# Patient Record
Sex: FEMALE | Race: ASIAN | NOT HISPANIC OR LATINO | URBAN - METROPOLITAN AREA
[De-identification: names, ages, dates, MRNs, and addresses within clinical notes are randomized per-mention and may not be internally consistent; named-entity substitution may affect disease eponyms.]

---

## 2024-03-04 ENCOUNTER — INPATIENT (INPATIENT)
Facility: HOSPITAL | Age: 29
LOS: 0 days | Discharge: HOME | DRG: 603 | End: 2024-03-05
Attending: INTERNAL MEDICINE | Admitting: HOSPITALIST
Payer: COMMERCIAL

## 2024-03-04 VITALS
RESPIRATION RATE: 96 BRPM | TEMPERATURE: 98 F | SYSTOLIC BLOOD PRESSURE: 135 MMHG | HEART RATE: 100 BPM | OXYGEN SATURATION: 97 % | DIASTOLIC BLOOD PRESSURE: 90 MMHG | HEIGHT: 62 IN

## 2024-03-04 DIAGNOSIS — L03.90 CELLULITIS, UNSPECIFIED: ICD-10-CM

## 2024-03-04 LAB
ALBUMIN SERPL ELPH-MCNC: 4.5 G/DL — SIGNIFICANT CHANGE UP (ref 3.5–5.2)
ALP SERPL-CCNC: 56 U/L — SIGNIFICANT CHANGE UP (ref 30–115)
ALT FLD-CCNC: <5 U/L — SIGNIFICANT CHANGE UP (ref 0–41)
ANION GAP SERPL CALC-SCNC: 12 MMOL/L — SIGNIFICANT CHANGE UP (ref 7–14)
AST SERPL-CCNC: 19 U/L — SIGNIFICANT CHANGE UP (ref 0–41)
BASOPHILS # BLD AUTO: 0.06 K/UL — SIGNIFICANT CHANGE UP (ref 0–0.2)
BASOPHILS NFR BLD AUTO: 0.6 % — SIGNIFICANT CHANGE UP (ref 0–1)
BILIRUB SERPL-MCNC: 0.3 MG/DL — SIGNIFICANT CHANGE UP (ref 0.2–1.2)
BUN SERPL-MCNC: 7 MG/DL — LOW (ref 10–20)
CALCIUM SERPL-MCNC: 10.1 MG/DL — SIGNIFICANT CHANGE UP (ref 8.4–10.5)
CHLORIDE SERPL-SCNC: 103 MMOL/L — SIGNIFICANT CHANGE UP (ref 98–110)
CO2 SERPL-SCNC: 26 MMOL/L — SIGNIFICANT CHANGE UP (ref 17–32)
CREAT SERPL-MCNC: 0.6 MG/DL — LOW (ref 0.7–1.5)
EGFR: 127 ML/MIN/1.73M2 — SIGNIFICANT CHANGE UP
EOSINOPHIL # BLD AUTO: 0.16 K/UL — SIGNIFICANT CHANGE UP (ref 0–0.7)
EOSINOPHIL NFR BLD AUTO: 1.7 % — SIGNIFICANT CHANGE UP (ref 0–8)
GLUCOSE SERPL-MCNC: 99 MG/DL — SIGNIFICANT CHANGE UP (ref 70–99)
HCT VFR BLD CALC: 40.1 % — SIGNIFICANT CHANGE UP (ref 37–47)
HGB BLD-MCNC: 13 G/DL — SIGNIFICANT CHANGE UP (ref 12–16)
IMM GRANULOCYTES NFR BLD AUTO: 0.3 % — SIGNIFICANT CHANGE UP (ref 0.1–0.3)
LYMPHOCYTES # BLD AUTO: 2.3 K/UL — SIGNIFICANT CHANGE UP (ref 1.2–3.4)
LYMPHOCYTES # BLD AUTO: 24.6 % — SIGNIFICANT CHANGE UP (ref 20.5–51.1)
MCHC RBC-ENTMCNC: 29.1 PG — SIGNIFICANT CHANGE UP (ref 27–31)
MCHC RBC-ENTMCNC: 32.4 G/DL — SIGNIFICANT CHANGE UP (ref 32–37)
MCV RBC AUTO: 89.7 FL — SIGNIFICANT CHANGE UP (ref 81–99)
MONOCYTES # BLD AUTO: 0.66 K/UL — HIGH (ref 0.1–0.6)
MONOCYTES NFR BLD AUTO: 7.1 % — SIGNIFICANT CHANGE UP (ref 1.7–9.3)
NEUTROPHILS # BLD AUTO: 6.13 K/UL — SIGNIFICANT CHANGE UP (ref 1.4–6.5)
NEUTROPHILS NFR BLD AUTO: 65.7 % — SIGNIFICANT CHANGE UP (ref 42.2–75.2)
NRBC # BLD: 0 /100 WBCS — SIGNIFICANT CHANGE UP (ref 0–0)
PLATELET # BLD AUTO: 353 K/UL — SIGNIFICANT CHANGE UP (ref 130–400)
PMV BLD: 8.7 FL — SIGNIFICANT CHANGE UP (ref 7.4–10.4)
POTASSIUM SERPL-MCNC: 4.8 MMOL/L — SIGNIFICANT CHANGE UP (ref 3.5–5)
POTASSIUM SERPL-SCNC: 4.8 MMOL/L — SIGNIFICANT CHANGE UP (ref 3.5–5)
PROT SERPL-MCNC: 8.2 G/DL — HIGH (ref 6–8)
RBC # BLD: 4.47 M/UL — SIGNIFICANT CHANGE UP (ref 4.2–5.4)
RBC # FLD: 13.5 % — SIGNIFICANT CHANGE UP (ref 11.5–14.5)
SODIUM SERPL-SCNC: 141 MMOL/L — SIGNIFICANT CHANGE UP (ref 135–146)
WBC # BLD: 9.34 K/UL — SIGNIFICANT CHANGE UP (ref 4.8–10.8)
WBC # FLD AUTO: 9.34 K/UL — SIGNIFICANT CHANGE UP (ref 4.8–10.8)

## 2024-03-04 PROCEDURE — 87140 CULTURE TYPE IMMUNOFLUORESC: CPT

## 2024-03-04 PROCEDURE — 87070 CULTURE OTHR SPECIMN AEROBIC: CPT

## 2024-03-04 PROCEDURE — 85652 RBC SED RATE AUTOMATED: CPT

## 2024-03-04 PROCEDURE — 85027 COMPLETE CBC AUTOMATED: CPT

## 2024-03-04 PROCEDURE — 80048 BASIC METABOLIC PNL TOTAL CA: CPT

## 2024-03-04 PROCEDURE — 86140 C-REACTIVE PROTEIN: CPT

## 2024-03-04 PROCEDURE — 87255 GENET VIRUS ISOLATE HSV: CPT

## 2024-03-04 PROCEDURE — 99223 1ST HOSP IP/OBS HIGH 75: CPT

## 2024-03-04 PROCEDURE — 36415 COLL VENOUS BLD VENIPUNCTURE: CPT

## 2024-03-04 PROCEDURE — 87186 SC STD MICRODIL/AGAR DIL: CPT

## 2024-03-04 PROCEDURE — 83735 ASSAY OF MAGNESIUM: CPT

## 2024-03-04 PROCEDURE — 87389 HIV-1 AG W/HIV-1&-2 AB AG IA: CPT

## 2024-03-04 PROCEDURE — 80074 ACUTE HEPATITIS PANEL: CPT

## 2024-03-04 PROCEDURE — G0378: CPT

## 2024-03-04 PROCEDURE — 99285 EMERGENCY DEPT VISIT HI MDM: CPT

## 2024-03-04 RX ORDER — ACYCLOVIR SODIUM 500 MG
VIAL (EA) INTRAVENOUS
Refills: 0 | Status: DISCONTINUED | OUTPATIENT
Start: 2024-03-04 | End: 2024-03-05

## 2024-03-04 RX ORDER — ACYCLOVIR SODIUM 500 MG
380 VIAL (EA) INTRAVENOUS EVERY 8 HOURS
Refills: 0 | Status: DISCONTINUED | OUTPATIENT
Start: 2024-03-04 | End: 2024-03-05

## 2024-03-04 RX ORDER — DIPHENHYDRAMINE HCL 50 MG
25 CAPSULE ORAL ONCE
Refills: 0 | Status: COMPLETED | OUTPATIENT
Start: 2024-03-04 | End: 2024-03-04

## 2024-03-04 RX ORDER — ACYCLOVIR SODIUM 500 MG
380 VIAL (EA) INTRAVENOUS ONCE
Refills: 0 | Status: COMPLETED | OUTPATIENT
Start: 2024-03-04 | End: 2024-03-04

## 2024-03-04 RX ORDER — LINEZOLID 600 MG/300ML
600 INJECTION, SOLUTION INTRAVENOUS EVERY 12 HOURS
Refills: 0 | Status: DISCONTINUED | OUTPATIENT
Start: 2024-03-04 | End: 2024-03-05

## 2024-03-04 RX ORDER — ERYTHROMYCIN BASE 5 MG/GRAM
1 OINTMENT (GRAM) OPHTHALMIC (EYE)
Refills: 0 | Status: DISCONTINUED | OUTPATIENT
Start: 2024-03-04 | End: 2024-03-05

## 2024-03-04 RX ADMIN — Medication 25 MILLIGRAM(S): at 21:19

## 2024-03-04 RX ADMIN — Medication 107.6 MILLIGRAM(S): at 21:19

## 2024-03-04 RX ADMIN — Medication 1 APPLICATION(S): at 23:27

## 2024-03-04 RX ADMIN — LINEZOLID 300 MILLIGRAM(S): 600 INJECTION, SOLUTION INTRAVENOUS at 15:29

## 2024-03-04 RX ADMIN — Medication 107.6 MILLIGRAM(S): at 16:43

## 2024-03-04 NOTE — H&P ADULT - ASSESSMENT
27 y/o F with no PMH, admitted for treatment pf pre-septal cellulitis with suspected herpetic infection.    #Pre-septal pustular cellulitis  #suspected herpetic infection/V1 shingles?  - no visual defects  - normal eye movements  - corneal staining WNL  - c/w acyclovir  - c/w linezolid  - consult ID  - Ophthalmo recs appreciated    >Erythromycin ointment 3 x days around the eye.    >Derm consult would be appreciated

## 2024-03-04 NOTE — H&P ADULT - NSHPLABSRESULTS_GEN_ALL_CORE
LABS:                          13.0   9.34  )-----------( 353      ( 04 Mar 2024 14:54 )             40.1     03-04    141  |  103  |  7<L>  ----------------------------<  99  4.8   |  26  |  0.6<L>    Ca    10.1      04 Mar 2024 14:54    TPro  8.2<H>  /  Alb  4.5  /  TBili  0.3  /  DBili  x   /  AST  19  /  ALT  <5  /  AlkPhos  56  03-04

## 2024-03-04 NOTE — H&P ADULT - ATTENDING COMMENTS
Patient seen and examined at bedside independently of the residents. I read the resident's note and agree with the plan with the additions and corrections as noted below. My note supersedes the resident's note.     REVIEW OF SYSTEMS:  Negative except in HPI.     PMH: Herpes labialis     FHx: Reviewed. No fhx of asthma/copd, No fhx of liver and pulmonary disease. No fhx of hematological disorder.     Physical Exam:  GEN: No acute distress. Awake, Alert and oriented x 3.   Head: Atraumatic, Normocephalic.   Eye: PEERLA. No sclera icterus. EOMI. Right eyelid swelling with multiple small pustular lesions (R>L)  ENT: Normal oropharynx, no thyromegaly, no mass, no lymphadenopathy.   LUNGS: Clear to auscultation bilaterally. No wheeze/rales/crackles.   HEART: Normal. S1/S2 present. RRR. No murmur/gallops.   ABD: Soft, non-tender, non-distended. Bowel sounds present.   EXT: No pitting edema. No erythema. No tenderness.  Integumentary: No rash, No sore, No petechia.   NEURO: CN III-XII intact. Strength: 5/5 b/l ULE. Sensory intact b/l ULE.     Vital Signs Last 24 Hrs  T(C): 36.7 (04 Mar 2024 21:30), Max: 36.7 (04 Mar 2024 20:55)  T(F): 98.1 (04 Mar 2024 21:30), Max: 98.1 (04 Mar 2024 20:55)  HR: 93 (04 Mar 2024 21:30) (92 - 100)  BP: 125/67 (04 Mar 2024 21:30) (122/73 - 135/90)  BP(mean): --  RR: 18 (04 Mar 2024 21:30) (18 - 96)  SpO2: 100% (04 Mar 2024 21:30) (97% - 100%)    Parameters below as of 04 Mar 2024 20:55  Patient On (Oxygen Delivery Method): room air      Please see the above notes for Labs and radiology.     Assessment and Plan:     27 yo F with no significant PMH, wear Ortho-K lens presents to ED for right eye lid swelling, pain with discharge. Patient is sexually active and reports that she had similar HSV infection around her lips a months ago which resolved on PO medication given by her dermatologist.     Right eye pre-septal cellulitis with suspected HSV infection   - s/p eval by ophthalmology in ED.   - start on acyclovir   - s/p Linezolid and Acyclovir given in ED.   - will c/w Linezolid and start on Unasyn for now  - check HIV, Hepatitis panel  - ID consult.   - f/u Ophthalmology    DVT ppx: encourage ambulation  GI ppx:  not indicated.   Diet: regular diet  Activity: as tolerated.     Date seen by the attendin2024  Total time spent: 75 minutes

## 2024-03-04 NOTE — CONSULT NOTE ADULT - SUBJECTIVE AND OBJECTIVE BOX
EYE EXAM BEDSIDE   Patient noticed on Saturday bumps and swollen lid of the right eye. It started a day ago on the left eye.   ROS all negative  Patient was recently in [place] and came back this weekend.     Patient endorses it feels worse, and itchy.     No change in vision, No pain on eye movement.   No Flashes, No Floaters  Patient Does ORtho-K , D/c contacts since Patient felt lid irritation     ACYCLOVIR is going to be started       Va: sc OD, OS 20/20     Pupils: PERRL NO APD  EOM:  Full with No pain on Movement   VF Full    SLE   OD Lid edema Superior and inferior,   Pustules Upper lid at least 11  Lower lid 20    OS Trace Edema with Nasal 2 superior and 2 inferior Pustules    Conj  OD temp hyperemic Grade 1 OS wnl  Cornea NO STAINING WNL   lens wnl   angle Deep and Quiet   IOP:   17 OD,OS via Icare at 3:15PM  DFE:  Deferred, Good View of Posterior   c/d .35  a/v 2/3  mac wnl         D/W Dr. Fadi Reyes

## 2024-03-04 NOTE — H&P ADULT - HISTORY OF PRESENT ILLNESS
27 y/o F with no PMH, overnight use of ortho-K lenses presenting for right eyelid swelling, pain, redness and pustular lesions over the past 2 days. Denies fever , chills, painful eye movement, pus drainage, or decrease in visual acuity.    + hx of chicken pox as a child  Labs and vitals are unremarkable.  patient admitted for treatment pf pre-septal cellulitis with suspected herpetic infection/shingles.

## 2024-03-04 NOTE — PATIENT PROFILE ADULT - PATIENT'S SEXUAL ORIENTATION
Refer to GYN for a cystocele evaluation  I will call your the urine test results  Keep your appointment with me as previously scheduled. Start ciprofloxacin 250 MG twice daily for 5 days. Pending cultures. Heterosexual

## 2024-03-04 NOTE — ED PROVIDER NOTE - CLINICAL SUMMARY MEDICAL DECISION MAKING FREE TEXT BOX
Patient presenting with bumps and swelling to the right eyelid now extending to the left eyelid.  She has no fever chills vision changes.  Did recently travel from .  On exam she had pustules surrounding the right eye Lids staining pupils the right eye within normal meds.  Left eye with a few scattered pustules.  Impression  Patient with pustules around the eye and discussed with ID recommended IV antibiotics, IV antivirals and admission.  Patient also seen by ophthalmology.  Ophthalmology recommending with mycin ointment and Derm consult.

## 2024-03-04 NOTE — ED PROVIDER NOTE - PHYSICAL EXAMINATION
CONSTITUTIONAL: Well-developed; well-nourished; in no acute distress.   SKIN: Warm, dry  HEAD: Normocephalic; atraumatic  EYES: EOMI, normal sclera and conjunctiva. Right upper and lower eyelid erythema and swelling. Multiple pustular lesions to upper and lower right eyelids; 1 pustular lesion to left upper eyelid and 1 to left lower, no drainage. OS 20/20 OD 20/20. No uptake of fluorescein stain.   ENT: No nasal discharge; airway clear. MMM. No oral lesions. Normal TM, ear canal b/l.   CARD:  Regular rate and rhythm. Normal S1, S2.  RESP: No increased WOB. CTA b/l without wheezes, crackles, rhonchi  EXT: Normal ROM.   NEURO: Alert, oriented, grossly unremarkable  PSYCH: Cooperative, appropriate.

## 2024-03-04 NOTE — PATIENT PROFILE ADULT - FALL HARM RISK - HARM RISK INTERVENTIONS

## 2024-03-04 NOTE — CONSULT NOTE ADULT - ASSESSMENT
1. 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   Pustules OD>OS    Recommendation   Erythromycin ointment 3 x days around the eye.   Derm consult would be appreciated    Reconsult if needed.

## 2024-03-04 NOTE — ED PROVIDER NOTE - OBJECTIVE STATEMENT
29 y/o F with no PMH, overnight use of ortho-K lenses presenting for right eyelid swelling, pain, redness lesions x2 days. Pt reports lesions have increased in number over past two days and since today has two lesions to left eyelid. Denies eye pain, vision changes, pain with eye movement. No fevers. [place] [facility] [place] to ED for evaluation for orbital cellulitis. Reports having cold sore "outbreak" 1 month ago. Wears glasses and contacts but no contacts for last few days. spontaneous rupture

## 2024-03-04 NOTE — ED PROVIDER NOTE - PROGRESS NOTE DETAILS
Demetrio: discussed with ID; per Dr. Metzger possible furunculosis, cover for HSV and ORSA. Will obtain labs Demetrio: Per ophthyimi topical bacitracin and they will see her inpatient

## 2024-03-04 NOTE — H&P ADULT - NSHPPHYSICALEXAM_GEN_ALL_CORE
T(C): 36.4 (03-04-24 @ 12:03), Max: 36.4 (03-04-24 @ 12:03)  HR: 100 (03-04-24 @ 12:03) (100 - 100)  BP: 135/90 (03-04-24 @ 12:03) (135/90 - 135/90)  RR: 96 (03-04-24 @ 12:03) (96 - 96)  SpO2: 97% (03-04-24 @ 12:03) (97% - 97%)    CONSTITUTIONAL:no apparent distress  EYES: PERRLA and symmetric, EOMI, No conjunctival or scleral injection, non-icteric, Redness oround right eye with multiple 1-2mm pustules, no pus drainage, normal visual acuity, no visual field defects  ENMT: Oral mucosa with moist membranes. Normal dentition; no pharyngeal injection or exudates   NECK: Supple, symmetric and without tracheal deviation   RESP: No respiratory distress, no use of accessory muscles; CTA b/l, no WRR  CV: RRR, +S1S2, no MRG; no JVD; no peripheral edema  GI: Soft, NT, ND, no rebound, no guarding; t

## 2024-03-04 NOTE — ED PROVIDER NOTE - CONSIDERATION OF ADMISSION OBSERVATION
Consideration of Admission/Observation Admit for IV antibiotics IV antivirals as bilateral symptoms reevaluation

## 2024-03-05 VITALS
HEART RATE: 80 BPM | RESPIRATION RATE: 18 BRPM | TEMPERATURE: 98 F | DIASTOLIC BLOOD PRESSURE: 62 MMHG | SYSTOLIC BLOOD PRESSURE: 114 MMHG | OXYGEN SATURATION: 98 %

## 2024-03-05 LAB
ANION GAP SERPL CALC-SCNC: 13 MMOL/L — SIGNIFICANT CHANGE UP (ref 7–14)
BUN SERPL-MCNC: 6 MG/DL — LOW (ref 10–20)
CALCIUM SERPL-MCNC: 9.3 MG/DL — SIGNIFICANT CHANGE UP (ref 8.4–10.5)
CHLORIDE SERPL-SCNC: 103 MMOL/L — SIGNIFICANT CHANGE UP (ref 98–110)
CO2 SERPL-SCNC: 25 MMOL/L — SIGNIFICANT CHANGE UP (ref 17–32)
CREAT SERPL-MCNC: 0.7 MG/DL — SIGNIFICANT CHANGE UP (ref 0.7–1.5)
CRP SERPL-MCNC: 4.6 MG/L — HIGH
EGFR: 122 ML/MIN/1.73M2 — SIGNIFICANT CHANGE UP
ERYTHROCYTE [SEDIMENTATION RATE] IN BLOOD: 48 MM/HR — HIGH (ref 0–20)
GLUCOSE SERPL-MCNC: 104 MG/DL — HIGH (ref 70–99)
HBV CORE IGM SER-ACNC: SIGNIFICANT CHANGE UP
HBV SURFACE AG SER-ACNC: SIGNIFICANT CHANGE UP
HCT VFR BLD CALC: 37.3 % — SIGNIFICANT CHANGE UP (ref 37–47)
HCV AB S/CO SERPL IA: 0.11 S/CO — SIGNIFICANT CHANGE UP (ref 0–0.99)
HCV AB SERPL-IMP: SIGNIFICANT CHANGE UP
HGB BLD-MCNC: 12.1 G/DL — SIGNIFICANT CHANGE UP (ref 12–16)
HIV 1+2 AB+HIV1 P24 AG SERPL QL IA: SIGNIFICANT CHANGE UP
MAGNESIUM SERPL-MCNC: 2.3 MG/DL — SIGNIFICANT CHANGE UP (ref 1.8–2.4)
MCHC RBC-ENTMCNC: 28.8 PG — SIGNIFICANT CHANGE UP (ref 27–31)
MCHC RBC-ENTMCNC: 32.4 G/DL — SIGNIFICANT CHANGE UP (ref 32–37)
MCV RBC AUTO: 88.8 FL — SIGNIFICANT CHANGE UP (ref 81–99)
NRBC # BLD: 0 /100 WBCS — SIGNIFICANT CHANGE UP (ref 0–0)
PLATELET # BLD AUTO: 314 K/UL — SIGNIFICANT CHANGE UP (ref 130–400)
PMV BLD: 8.8 FL — SIGNIFICANT CHANGE UP (ref 7.4–10.4)
POTASSIUM SERPL-MCNC: 4.2 MMOL/L — SIGNIFICANT CHANGE UP (ref 3.5–5)
POTASSIUM SERPL-SCNC: 4.2 MMOL/L — SIGNIFICANT CHANGE UP (ref 3.5–5)
RBC # BLD: 4.2 M/UL — SIGNIFICANT CHANGE UP (ref 4.2–5.4)
RBC # FLD: 13.5 % — SIGNIFICANT CHANGE UP (ref 11.5–14.5)
SODIUM SERPL-SCNC: 141 MMOL/L — SIGNIFICANT CHANGE UP (ref 135–146)
WBC # BLD: 8.34 K/UL — SIGNIFICANT CHANGE UP (ref 4.8–10.8)
WBC # FLD AUTO: 8.34 K/UL — SIGNIFICANT CHANGE UP (ref 4.8–10.8)

## 2024-03-05 PROCEDURE — 99238 HOSP IP/OBS DSCHRG MGMT 30/<: CPT

## 2024-03-05 RX ORDER — ERYTHROMYCIN BASE 5 MG/GRAM
1 OINTMENT (GRAM) OPHTHALMIC (EYE)
Qty: 1 | Refills: 0
Start: 2024-03-05 | End: 2024-03-07

## 2024-03-05 RX ORDER — AMPICILLIN SODIUM AND SULBACTAM SODIUM 250; 125 MG/ML; MG/ML
3 INJECTION, POWDER, FOR SUSPENSION INTRAMUSCULAR; INTRAVENOUS ONCE
Refills: 0 | Status: COMPLETED | OUTPATIENT
Start: 2024-03-05 | End: 2024-03-05

## 2024-03-05 RX ORDER — AMPICILLIN SODIUM AND SULBACTAM SODIUM 250; 125 MG/ML; MG/ML
INJECTION, POWDER, FOR SUSPENSION INTRAMUSCULAR; INTRAVENOUS
Refills: 0 | Status: DISCONTINUED | OUTPATIENT
Start: 2024-03-05 | End: 2024-03-05

## 2024-03-05 RX ORDER — AMPICILLIN SODIUM AND SULBACTAM SODIUM 250; 125 MG/ML; MG/ML
3 INJECTION, POWDER, FOR SUSPENSION INTRAMUSCULAR; INTRAVENOUS EVERY 6 HOURS
Refills: 0 | Status: DISCONTINUED | OUTPATIENT
Start: 2024-03-05 | End: 2024-03-05

## 2024-03-05 RX ORDER — ACYCLOVIR SODIUM 500 MG
1 VIAL (EA) INTRAVENOUS
Qty: 6 | Refills: 0
Start: 2024-03-05 | End: 2024-03-06

## 2024-03-05 RX ORDER — ACYCLOVIR SODIUM 500 MG
1 VIAL (EA) INTRAVENOUS
Qty: 9 | Refills: 0
Start: 2024-03-05 | End: 2024-03-07

## 2024-03-05 RX ORDER — ACYCLOVIR SODIUM 500 MG
800 VIAL (EA) INTRAVENOUS EVERY 8 HOURS
Refills: 0 | Status: DISCONTINUED | OUTPATIENT
Start: 2024-03-05 | End: 2024-03-06

## 2024-03-05 RX ADMIN — AMPICILLIN SODIUM AND SULBACTAM SODIUM 200 GRAM(S): 250; 125 INJECTION, POWDER, FOR SUSPENSION INTRAMUSCULAR; INTRAVENOUS at 03:22

## 2024-03-05 RX ADMIN — LINEZOLID 300 MILLIGRAM(S): 600 INJECTION, SOLUTION INTRAVENOUS at 06:00

## 2024-03-05 RX ADMIN — Medication 1 APPLICATION(S): at 06:01

## 2024-03-05 RX ADMIN — Medication 107.6 MILLIGRAM(S): at 06:02

## 2024-03-05 NOTE — CONSULT NOTE ADULT - ATTENDING COMMENTS
Assessment  Pt is a 28 y.o. F w/ no PMH, but does use overnight ortho-K lenses, who presents with R eyelid edema, erythema, and pustular lesions since 3/1; no impaired visual acuity, pain w/ eye mov'ts, proptosis, chemosis, or ophthalmoplegia. ID was consulted for pre-septal cellulitis w/ possible HSV infection.    IMPRESSION/RECOMMENDATIONS   #Acute pre-septal cellulitis likely 2/2 HSV-1/2 (prodrome of a day of itch typical for HSV )  No evidence of VZV   Not bacterial infection ( no induration, no overt pain at site and would not expect the jump from right to left periorbital site )  - obtain viral swab  - d/c linezolid and Unasyn IV  - c/w acyclovir for total of 5 days      - 800mg PO q8h for 2 days, then switch to 400mg PO q8h for 3 days

## 2024-03-05 NOTE — DISCHARGE NOTE PROVIDER - HOSPITAL COURSE
HPI:  27 y/o F with no PMH, overnight use of ortho-K lenses presenting for right eyelid swelling, pain, redness and pustular lesions over the past 2 days. Denies fever , chills, painful eye movement, pus drainage, or decrease in visual acuity. History of chicken pox as a child.     ED course:   Labs and vitals are unremarkable.  patient admitted for treatment pf pre-septal cellulitis with suspected herpetic infection/shingles.    Hospital course:   Patient admitted to medicine for treatment of pre-septal cellulitis with suspected herpetic infection/shingles. Seen by opthalmology while in the ED, recommended erythromycin drops for three days and derm evaluation. Patient was started on acyclovir, Unasyn, and linezolid on admission for epimeric coverage. ID recommended stopping antibiotics and continuing acyclovir. Bacterial and viral culture swab sent, pending results. Derm suspected that lesion secondary bacterial impetiginization secondary to viral HSV infection.     At time of discharge, patient is HDS. Recommended to follow up with opthalmology as an outpatient. Will send patient with 5-day course of acyclovir.

## 2024-03-05 NOTE — CONSULT NOTE ADULT - ASSESSMENT
Assessment      Impressions/Recommendations: Assessment  Pt is a 28 y.o. F w/ no PMH, but does use overnight ortho-K lenses, who presents with R eyelid edema, erythema, and pustular lesions since 3/1; no impaired visual acuity, pain w/ eye mov'ts, proptosis, chemosis, or ophthalmoplegia. ID was consulted for pre-septal cellulitis w/ possible HSV infection.    Impressions/Recommendations:  #Acute pre-septal cellulitis likely 2/2 HSV-1  - obtain bacterial/viral swab  - d/c linezolid and Unasyn IV  - c/w acyclovir for total of 7 days      - 800mg PO q8h for 2 days, then switch to 400mg PO q8h for 5 days

## 2024-03-05 NOTE — PROGRESS NOTE ADULT - ASSESSMENT
29 y/o F with no PMH, admitted for treatment pf pre-septal cellulitis with suspected herpetic infection.    #Pre-septal pustular cellulitis  #suspected herpetic infection/V1 shingles?  - no visual defects, EOMI  - c/w acyclovir, linezolid, and unasyn  - pending ID recs   - pending derm consult   - pending bacterial and viral culture swab   - Ophthalmo recs appreciated -> Erythromycin ointment 3 x days around the eye.     #Misc   - DVT ppx: not indicated, patient walking   - GI ppx: not indicated   - Diet: regular   - Activity: IAT   - Dispo: home

## 2024-03-05 NOTE — CONSULT NOTE ADULT - SUBJECTIVE AND OBJECTIVE BOX
Consultation Requested by:    Patient is a 28y old  Female who presents with a chief complaint of pustular pre-septal cellulits (05 Mar 2024 09:00)    HPI:  27 y/o F with no PMH, overnight use of ortho-K lenses presenting for right eyelid swelling, pain, redness and pustular lesions over the past 2 days. Denies fever , chills, painful eye movement, pus drainage, or decrease in visual acuity.    + hx of chicken pox as a child  Labs and vitals are unremarkable.  patient admitted for treatment pf pre-septal cellulitis with suspected herpetic infection/shingles. (04 Mar 2024 19:20)      REVIEW OF SYSTEMS  All review of systems negative, except for those marked:  General:		[] Abnormal:  	[] Night Sweats		[] Fever		[] Weight Loss  Pulmonary/Cough:	[] Abnormal:  Cardiac/Chest Pain:	[] Abnormal:  Gastrointestinal:	[] Abnormal:  Eyes:			[] Abnormal:  ENT:			[] Abnormal:  Dysuria:		[] Abnormal:  Musculoskeletal	:	[] Abnormal:  Endocrine:		[] Abnormal:  Lymph Nodes:		[] Abnormal:  Headache:		[] Abnormal:  Skin:			[] Abnormal:  Allergy/Immune:	[] Abnormal:  Psychiatric:		[] Abnormal:  [] All other review of systems negative  [] Unable to obtain (explain):    Recent Ill Contacts:	[] No	[] Yes:  Recent Travel History:	[] No	[] Yes:  Recent Animal/Insect Exposure/Tick Bites:	[] No	[] Yes:    Allergies    No Known Allergies    Intolerances      Antimicrobials:  acyclovir IVPB 380 milliGRAM(s) IV Intermittent every 8 hours  acyclovir IVPB      ampicillin/sulbactam  IVPB      ampicillin/sulbactam  IVPB 3 Gram(s) IV Intermittent every 6 hours  linezolid  IVPB 600 milliGRAM(s) IV Intermittent every 12 hours      Other Medications:  erythromycin   Ointment 1 Application(s) Right EYE four times a day      FAMILY HISTORY:    PAST MEDICAL & SURGICAL HISTORY:  No pertinent past medical history        SOCIAL HISTORY:    IMMUNIZATIONS  [] Up to Date		[] Not Up to Date:  Recent Immunizations:	[] No	[] Yes:    Daily Height in cm: 157.48 (04 Mar 2024 12:03)    Daily   Head Circumference:  Vital Signs Last 24 Hrs  T(C): 36.8 (05 Mar 2024 05:00), Max: 36.8 (05 Mar 2024 05:00)  T(F): 98.2 (05 Mar 2024 05:00), Max: 98.2 (05 Mar 2024 05:00)  HR: 77 (05 Mar 2024 05:00) (77 - 100)  BP: 112/79 (05 Mar 2024 05:00) (112/79 - 135/90)  BP(mean): --  RR: 18 (05 Mar 2024 05:00) (18 - 96)  SpO2: 100% (04 Mar 2024 21:30) (97% - 100%)    Parameters below as of 04 Mar 2024 20:55  Patient On (Oxygen Delivery Method): room air        PHYSICAL EXAM  All physical exam findings normal, except for those marked:  General:	Normal: alert, neither acutely nor chronically ill-appearing, well developed/well   .		nourished, no respiratory distress  .		[] Abnormal:  Eyes		Normal: no conjunctival injection, no discharge, no photophobia, intact   .		extraocular movements, sclera not icteric  .		[] Abnormal:  ENT:		Normal: normal tympanic membranes; external ear normal, nares normal without   .		discharge, no pharyngeal erythema or exudates, no oral mucosal lesions, normal   .		tongue and lips  .		[] Abnormal:  Neck		Normal: supple, full range of motion, no nuchal rigidity  .		[] Abnormal:  Lymph Nodes	Normal: normal size and consistency, non-tender  .		[] Abnormal:  Cardiovascular	Normal: regular rate and variability; Normal S1, S2; No murmur  .		[] Abnormal:  Respiratory	Normal: no wheezing or crackles, bilateral audible breath sounds, no retractions  .		[] Abnormal:  Abdominal	Normal: soft; non-distended; non-tender; no hepatosplenomegaly or masses  .		[] Abnormal:  		Normal: normal external genitalia, no rash  .		[] Abnormal:  Extremities	Normal: FROM x4, no cyanosis or edema, symmetric pulses  .		[] Abnormal:  Skin		Normal: skin intact and not indurated; no rash, no desquamation  .		[] Abnormal:  Neurologic	Normal: alert, oriented as age-appropriate, affect appropriate; no weakness, no   .		facial asymmetry, moves all extremities, normal gait-child older than 18 months  .		[] Abnormal:  Musculoskeletal		Normal: no joint swelling, erythema, or tenderness; full range of motion   .			with no contractures; no muscle tenderness; no clubbing; no cyanosis;   .			no edema  .			[] Abnormal    Respiratory Support:		[] No	[] Yes:  Vasoactive medication infusion:	[] No	[] Yes:  Venous catheters:		[] No	[] Yes:  Bladder catheter:		[] No	[] Yes:  Other catheters or tubes:	[] No	[] Yes:    Lab Results:                        13.0   9.34  )-----------( 353      ( 04 Mar 2024 14:54 )             40.1     03-04    141  |  103  |  7<L>  ----------------------------<  99  4.8   |  26  |  0.6<L>    Ca    10.1      04 Mar 2024 14:54    TPro  8.2<H>  /  Alb  4.5  /  TBili  0.3  /  DBili  x   /  AST  19  /  ALT  <5  /  AlkPhos  56  03-04    LIVER FUNCTIONS - ( 04 Mar 2024 14:54 )  Alb: 4.5 g/dL / Pro: 8.2 g/dL / ALK PHOS: 56 U/L / ALT: <5 U/L / AST: 19 U/L / GGT: x             Urinalysis Basic - ( 04 Mar 2024 14:54 )    Color: x / Appearance: x / SG: x / pH: x  Gluc: 99 mg/dL / Ketone: x  / Bili: x / Urobili: x   Blood: x / Protein: x / Nitrite: x   Leuk Esterase: x / RBC: x / WBC x   Sq Epi: x / Non Sq Epi: x / Bacteria: x        MICROBIOLOGY    [] Pathology slides reviewed and/or discussed with pathologist  [] Microbiology findings discussed with microbiologist or slides reviewed  [] Images reviewed with radiologist  [] Case discussed with an attending physician in addition to the patient's primary physician  [] Records, reports from outside [facility] reviewed    [] Patient requires continued monitoring for:  [] Total critical care time spent by attending physician: __ minutes, excluding procedure time. Consultation Requested by:    Patient is a 28y old  Female who presents with a chief complaint of pustular pre-septal cellulits (05 Mar 2024 09:00)    HPI:  29 y/o F with no PMH, overnight use of ortho-K lenses presenting for right eyelid swelling, pain, redness and pustular lesions over the past 2 days. Denies fever , chills, painful eye movement, pus drainage, or decrease in visual acuity.    + hx of chicken pox as a child  Labs and vitals are unremarkable.  patient admitted for treatment pf pre-septal cellulitis with suspected herpetic infection/shingles. (04 Mar 2024 19:20)    Upon examination today, pt stated that she recently went to [place] and came back this weekend. She first started experiencing erythema and pruritis of the R eye on Friday night. On Saturday night, the pustular lesions started appearing on the R eye. A few lesions formed on the L eye on Monday. She denied having a history of genital herpes but did endorse having a "cold sore outbreak" 1 mo. ago.     REVIEW OF SYSTEMS  All review of systems negative, except for those marked:  General:		[] Abnormal:  	[] Night Sweats		[] Fever		[] Weight Loss  Pulmonary/Cough:	[] Abnormal:  Cardiac/Chest Pain:	[] Abnormal:  Gastrointestinal:	[] Abnormal:  Eyes:			[X] Abnormal: R eye erythema and edema w/ several lesions on eyelid and below eye; some lesions on L eye  ENT:			[] Abnormal:  Dysuria:		[] Abnormal:  Musculoskeletal	:	[] Abnormal:  Endocrine:		[] Abnormal:  Lymph Nodes:		[] Abnormal:  Headache:		[] Abnormal:  Skin:			[] Abnormal:  Allergy/Immune:	[] Abnormal:  Psychiatric:		[] Abnormal:  [X] All other review of systems negative  [] Unable to obtain (explain):    Recent Ill Contacts:	[X] No	[] Yes:  Recent Travel History:	[] No	[X] Yes: [place]  Recent Animal/Insect Exposure/Tick Bites:	[X] No	[] Yes:    Allergies    No Known Allergies    Intolerances      Antimicrobials:  acyclovir IVPB 380 milliGRAM(s) IV Intermittent every 8 hours  acyclovir IVPB      ampicillin/sulbactam  IVPB      ampicillin/sulbactam  IVPB 3 Gram(s) IV Intermittent every 6 hours  linezolid  IVPB 600 milliGRAM(s) IV Intermittent every 12 hours      Other Medications:  erythromycin   Ointment 1 Application(s) Right EYE four times a day      FAMILY HISTORY:    PAST MEDICAL & SURGICAL HISTORY:  No pertinent past medical history        SOCIAL HISTORY:    IMMUNIZATIONS  [] Up to Date		[] Not Up to Date:  Recent Immunizations:	[] No	[] Yes:    Daily Height in cm: 157.48 (04 Mar 2024 12:03)    Daily   Head Circumference:  Vital Signs Last 24 Hrs  T(C): 36.8 (05 Mar 2024 05:00), Max: 36.8 (05 Mar 2024 05:00)  T(F): 98.2 (05 Mar 2024 05:00), Max: 98.2 (05 Mar 2024 05:00)  HR: 77 (05 Mar 2024 05:00) (77 - 100)  BP: 112/79 (05 Mar 2024 05:00) (112/79 - 135/90)  BP(mean): --  RR: 18 (05 Mar 2024 05:00) (18 - 96)  SpO2: 100% (04 Mar 2024 21:30) (97% - 100%)    Parameters below as of 04 Mar 2024 20:55  Patient On (Oxygen Delivery Method): room air        PHYSICAL EXAM  All physical exam findings normal, except for those marked:  General:	Normal: alert, neither acutely nor chronically ill-appearing, well developed/well   .		nourished, no respiratory distress  .		[] Abnormal:  Eyes		Normal: no conjunctival injection, no discharge, no photophobia, intact   .		extraocular movements, sclera not icteric  .		[X] Abnormal: R eye erythema and edema w/ several lesions on eyelid and below eye; some lesions on L eye; no pain w/ eye mov't, no impaired visual acuity, no proptosis or chemosis, no ophthalmoplegia  ENT:		Normal: normal tympanic membranes; external ear normal, nares normal without   .		discharge, no pharyngeal erythema or exudates, no oral mucosal lesions, normal   .		tongue and lips  .		[] Abnormal:  Neck		Normal: supple, full range of motion, no nuchal rigidity  .		[] Abnormal:  Lymph Nodes	Normal: normal size and consistency, non-tender  .		[] Abnormal:  Cardiovascular	Normal: regular rate and variability; Normal S1, S2; No murmur  .		[] Abnormal:  Respiratory	Normal: no wheezing or crackles, bilateral audible breath sounds, no retractions  .		[] Abnormal:  Abdominal	Normal: soft; non-distended; non-tender; no hepatosplenomegaly or masses  .		[] Abnormal:  		Normal: normal external genitalia, no rash  .		[] Abnormal:  Extremities	Normal: FROM x4, no cyanosis or edema, symmetric pulses  .		[] Abnormal:  Skin		Normal: skin intact and not indurated; no rash, no desquamation  .		[] Abnormal:  Neurologic	Normal: alert, oriented as age-appropriate, affect appropriate; no weakness, no   .		facial asymmetry, moves all extremities, normal gait-child older than 18 months  .		[] Abnormal:  Musculoskeletal		Normal: no joint swelling, erythema, or tenderness; full range of motion   .			with no contractures; no muscle tenderness; no clubbing; no cyanosis;   .			no edema  .			[] Abnormal    Respiratory Support:		[X] No	[] Yes:  Vasoactive medication infusion:	[X] No	[] Yes:  Venous catheters:		[X] No	[] Yes:  Bladder catheter:		[X] No	[] Yes:  Other catheters or tubes:	[X] No	[] Yes:    Lab Results:                        13.0   9.34  )-----------( 353      ( 04 Mar 2024 14:54 )             40.1     03-04    141  |  103  |  7<L>  ----------------------------<  99  4.8   |  26  |  0.6<L>    Ca    10.1      04 Mar 2024 14:54    TPro  8.2<H>  /  Alb  4.5  /  TBili  0.3  /  DBili  x   /  AST  19  /  ALT  <5  /  AlkPhos  56  03-04    LIVER FUNCTIONS - ( 04 Mar 2024 14:54 )  Alb: 4.5 g/dL / Pro: 8.2 g/dL / ALK PHOS: 56 U/L / ALT: <5 U/L / AST: 19 U/L / GGT: x             Urinalysis Basic - ( 04 Mar 2024 14:54 )    Color: x / Appearance: x / SG: x / pH: x  Gluc: 99 mg/dL / Ketone: x  / Bili: x / Urobili: x   Blood: x / Protein: x / Nitrite: x   Leuk Esterase: x / RBC: x / WBC x   Sq Epi: x / Non Sq Epi: x / Bacteria: x        MICROBIOLOGY    [] Pathology slides reviewed and/or discussed with pathologist  [] Microbiology findings discussed with microbiologist or slides reviewed  [] Images reviewed with radiologist  [] Case discussed with an attending physician in addition to the patient's primary physician  [] Records, reports from outside [facility] reviewed    [] Patient requires continued monitoring for:  [] Total critical care time spent by attending physician: __ minutes, excluding procedure time.

## 2024-03-05 NOTE — DISCHARGE NOTE PROVIDER - CARE PROVIDER_API CALL
Fadi Reyes  Ophthalmology  [address], [address]  [place] [address]  Phone: [phone]  Fax: [phone]  Follow Up Time: 2 weeks

## 2024-03-05 NOTE — DISCHARGE NOTE NURSING/CASE MANAGEMENT/SOCIAL WORK - PATIENT PORTAL LINK FT
You can access the FollowMyHealth Patient Portal offered by [facility] by registering at the following website: [email or web address]. By joining [facility]’s FollowMyHealth portal, you will also be able to view your health information using other applications (apps) compatible with our system.

## 2024-03-05 NOTE — CONSULT NOTE ADULT - SUBJECTIVE AND OBJECTIVE BOX
HPI:  27 y/o F with no PMH, overnight use of ortho-K lenses presenting for right eyelid swelling, pain, redness and pustular lesions over the past 2 days. Denies fever , chills, painful eye movement, pus drainage, or decrease in visual acuity.    + hx of chicken pox as a child  Labs and vitals are unremarkable.  patient admitted for treatment pf pre-septal cellulitis with suspected herpetic infection/shingles. (04 Mar 2024 19:20)    ============  Dermatology consulted for cellulitis and pustular lesions over past 2 days.     PAST MEDICAL & SURGICAL HISTORY  No pertinent past medical history        FAMILY HISTORY:  FAMILY HISTORY:      SOCIAL HISTORY:  []smoker  []Alcohol  []Drug    ALLERGIES:  No Known Allergies      MEDICATIONS:  MEDICATIONS  (STANDING):  acyclovir   Oral Tab/Cap 800 milliGRAM(s) Oral every 8 hours  acyclovir IVPB 380 milliGRAM(s) IV Intermittent every 8 hours  acyclovir IVPB        MEDICATIONS  (PRN):      HOME MEDICATIONS:  Home Medications:      VITALS:   T(F): 98.2 (03-05 @ 05:00), Max: 98.2 (03-05 @ 05:00)  HR: 77 (03-05 @ 05:00) (77 - 100)  BP: 112/79 (03-05 @ 05:00) (112/79 - 135/90)  BP(mean): --  RR: 18 (03-05 @ 05:00) (18 - 96)  SpO2: 100% (03-04 @ 21:30) (97% - 100%)    I&O's Summary      REVIEW OF SYSTEMS:  CONSTITUTIONAL: No weakness, fevers or chills  EYES: preseptal rash  ENT: No vertigo or throat pain   NECK: No pain or stiffness  RESPIRATORY: No cough, wheezing, hemoptysis; No shortness of breath  CARDIOVASCULAR: No chest pain or palpitations  GASTROINTESTINAL: No abdominal or epigastric pain. No nausea, vomiting, or hematemesis; No diarrhea or constipation. No melena or hematochezia.  GENITOURINARY: No Polyuria  NEUROLOGICAL:  No tremors, no Weakness or numbness  SKIN: (+) rash onpreseptal area  MSK: no joint pain    PHYSICAL EXAM:  GENERAL: Patient is awake , alert and oriented,  not in acute distress  EYES:   NECK: No thyroid enlargement, no palpable nodules , no bruit  LUNGS: Clear to auscultation bilaterally   CARDIOVASCULAR: S1/S2 present, RRR , no murmurs or rubs  ABD: Soft, non-tender, non-distended, +BS  EXT: No CARROL  SKIN:   NEURO: No tremors, DTR 2+    LABS:                        13.0   9.34  )-----------( 353      ( 04 Mar 2024 14:54 )             40.1     03-04    141  |  103  |  7<L>  ----------------------------<  99  4.8   |  26  |  0.6<L>    Ca    10.1      04 Mar 2024 14:54    TPro  8.2<H>  /  Alb  4.5  /  TBili  0.3  /  DBili  x   /  AST  19  /  ALT  <5  /  AlkPhos  56  03-04                   HPI:  29 y/o F with no PMH, overnight use of ortho-K lenses presenting for right eyelid swelling, pain, redness and pustular lesions over the past 2 days. Denies fever , chills, painful eye movement, pus drainage, or decrease in visual acuity.    + hx of chicken pox as a child  Labs and vitals are unremarkable.  patient admitted for treatment pf pre-septal cellulitis with suspected herpetic infection/shingles. (04 Mar 2024 19:20)    ============  Dermatology consulted for cellulitis and pustular lesions over past 2 days. denies pain, light sensitivity. endorses pustular discharge    PAST MEDICAL & SURGICAL HISTORY  No pertinent past medical history        FAMILY HISTORY:  FAMILY HISTORY:      SOCIAL HISTORY:  []smoker  []Alcohol  []Drug    ALLERGIES:  No Known Allergies      MEDICATIONS:  MEDICATIONS  (STANDING):  acyclovir   Oral Tab/Cap 800 milliGRAM(s) Oral every 8 hours  acyclovir IVPB 380 milliGRAM(s) IV Intermittent every 8 hours  acyclovir IVPB        MEDICATIONS  (PRN):      HOME MEDICATIONS:  Home Medications:      VITALS:   T(F): 98.2 (03-05 @ 05:00), Max: 98.2 (03-05 @ 05:00)  HR: 77 (03-05 @ 05:00) (77 - 100)  BP: 112/79 (03-05 @ 05:00) (112/79 - 135/90)  BP(mean): --  RR: 18 (03-05 @ 05:00) (18 - 96)  SpO2: 100% (03-04 @ 21:30) (97% - 100%)    I&O's Summary      REVIEW OF SYSTEMS:  CONSTITUTIONAL: No weakness, fevers or chills  EYES: preseptal rash  ENT: No vertigo or throat pain   NECK: No pain or stiffness  RESPIRATORY: No cough, wheezing, hemoptysis; No shortness of breath  CARDIOVASCULAR: No chest pain or palpitations  GASTROINTESTINAL: No abdominal or epigastric pain. No nausea, vomiting, or hematemesis; No diarrhea or constipation. No melena or hematochezia.  GENITOURINARY: No Polyuria  NEUROLOGICAL:  No tremors, no Weakness or numbness  SKIN: (+) rash onpreseptal area  MSK: no joint pain    PHYSICAL EXAM:  GENERAL: Patient is awake , alert and oriented,  not in acute distress  EYES: dried pustules along lower and upper eyelid with erythema  NECK: No thyroid enlargement, no palpable nodules , no bruit  LUNGS: Clear to auscultation bilaterally   CARDIOVASCULAR: S1/S2 present, RRR , no murmurs or rubs  ABD: Soft, non-tender, non-distended, +BS  EXT: No CARROL  SKIN: see eye exam  NEURO: No tremors, DTR 2+    LABS:                        13.0   9.34  )-----------( 353      ( 04 Mar 2024 14:54 )             40.1     03-04    141  |  103  |  7<L>  ----------------------------<  99  4.8   |  26  |  0.6<L>    Ca    10.1      04 Mar 2024 14:54    TPro  8.2<H>  /  Alb  4.5  /  TBili  0.3  /  DBili  x   /  AST  19  /  ALT  <5  /  AlkPhos  56  03-04                   HPI:  27 y/o F with no PMH, overnight use of ortho-K lenses presenting with right eyelid swelling and redness with tender crusted periocular lesions, redness over the past 2 days. Patient came back from [place] this weekend and it started with erythema and pruritus of R eye Friday night, with R periocular lesions forming appearing Saturday night, and L eyelid lesion on Monday. No change in eye vision. She reported a "cold sore outbreak" ~ a month ago.     Dermatology consulted for cellulitis and periocular lesions for 2 days. Denies pain, light sensitivity. Denies fever , chills, painful eye movement, pus drainage. Denies  decrease in visual acuity.   Patient admitted for treatment pf pre-septal cellulitis with suspected herpetic infection/shingles. (04 Mar 2024 19:20). Seen by Ophtho on March 4th with no staining of Cornea.   Slightly better after treatment with acylovir, ampicillin/ sulbactam and linezolid, and erythromycin ointment four times / day.     MEDICATIONS  (STANDING):  acyclovir   Oral Tab/Cap 800 milliGRAM(s) Oral every 8 hours  acyclovir IVPB 380 milliGRAM(s) IV Intermittent every 8 hours  acyclovir IVPB          + hx of chicken pox as a child      Labs and vitals are unremarkable.    ============      PAST MEDICAL & SURGICAL HISTORY  NC    ALLERGIES:  No Known Allergies        HOME MEDICATIONS:  Home Medications:      VITALS:   T(F): 98.2 (03-05 @ 05:00), Max: 98.2 (03-05 @ 05:00)  HR: 77 (03-05 @ 05:00) (77 - 100)  BP: 112/79 (03-05 @ 05:00) (112/79 - 135/90)  BP(mean): --  RR: 18 (03-05 @ 05:00) (18 - 96)  SpO2: 100% (03-04 @ 21:30) (97% - 100%)    I&O's Summary      REVIEW OF SYSTEMS:  CONSTITUTIONAL: No weakness, fevers or chills  EYES: periocular lesions/ see below  ENT: No vertigo or throat pain   NECK: No pain or stiffness  RESPIRATORY: No cough, wheezing, hemoptysis; No shortness of breath  CARDIOVASCULAR: No chest pain or palpitations  GASTROINTESTINAL: No abdominal or epigastric pain. No nausea, vomiting, or hematemesis; No diarrhea or constipation. No melena or hematochezia.  GENITOURINARY: No Polyuria  NEUROLOGICAL:  No tremors, no Weakness or numbness  SKIN: (+) periocular lesions: no joint pain    PHYSICAL EXAM:  GENERAL: Patient is awake , alert and oriented,  not in acute distress  Periocular skin: grouped erythematous papules and erosions R periocular, including the upper and the lower R eyelid, and one crusted papule on the R medial upper eyelid, with yellowish crust; slightly erythematous blt eyelids, with clear conjunctiva.   NECK: No thyroid enlargement, no palpable nodules , no bruit  LUNGS: Clear to auscultation bilaterally   CARDIOVASCULAR: S1/S2 present, RRR , no murmurs or rubs  ABD: Soft, non-tender, non-distended, +BS  EXT: No CARROL  SKIN: see eye exam  NEURO: No tremors, DTR 2+    LABS: CBC March 5 WNL                        13.0   9.34  )-----------( 353      ( 04 Mar 2024 14:54 )             40.1       03-04    141  |  103  |  7<L>  ----------------------------<  99  4.8   |  26  |  0.6<L>    Ca    10.1      04 Mar 2024 14:54    TPro  8.2<H>  /  Alb  4.5  /  TBili  0.3  /  DBili  x   /  AST  19  /  ALT  <5  /  AlkPhos  56  03-04

## 2024-03-05 NOTE — CONSULT NOTE ADULT - ATTENDING COMMENTS
29 y/o F with no PMH, overnight use of ortho-K lenses presenting for right eyelid swelling, pain, redness and pustular lesions over the past 2 days:       #Preseptal cellulitis- likely secondary to HSV, with impetiginization can't be ruled out due to the yellowish crust, vision not affected so far, should continue close f/u with opthalmology.  - has hx of recent cold sore outbreak  - follow up viral and bacterial wound cultures  - cont with current acyclovir treatment as per ID  - cont topical erthyromycin oint four times / day to affected skin periocular     Please don't hesitate to contact me on Teams with any questions.

## 2024-03-05 NOTE — DISCHARGE NOTE PROVIDER - NSDCCPCAREPLAN_GEN_ALL_CORE_FT
PRINCIPAL DISCHARGE DIAGNOSIS  Diagnosis: Preseptal cellulitis  Assessment and Plan of Treatment: You were admitted to the hospital for preseptal cellulitis, or an infection of the skin around your eye. You were treated with IV antibiotics. You were seen by dermatology and infectious disease, who determined that the rash was most likely a secondary bacterial infection superimposed on a herpes rash. You hortencia be discharge with a 5-day course of anti-viral medication for the herpes rash. It is recommended that you follow up with opthamology as an outpatient.   Call your doctor or returnt o the emergency room or call 911 immediately if you have any of the following: Difficulty or pain when moving the joints above or below the infected area, Discharge or pus draining from the area, rever of 100.4°F (38°C) or higher, or as directed by your healthcare provider, pain that gets worse in or around the infected site, redness that gets worse in or around the infected area, particularly if the area of redness expands to a wider area, shaking chills, swelling of the infected area, vomiting.

## 2024-03-05 NOTE — DISCHARGE NOTE PROVIDER - NSDCMRMEDTOKEN_GEN_ALL_CORE_FT
acyclovir 400 mg oral tablet: 1 tab(s) orally 3 times a day  acyclovir 800 mg oral tablet: 1 tab(s) orally 3 times a day  erythromycin 0.5% ophthalmic ointment: 1 application to each affected eye 4 times a day APPLY AROUND THE AFFECTED EYE

## 2024-03-05 NOTE — CONSULT NOTE ADULT - CONSULT REASON
pre-septal cellulitis with suspected herpectic infection pre-septal cellulitis with suspected herpetic infection

## 2024-03-05 NOTE — DISCHARGE NOTE NURSING/CASE MANAGEMENT/SOCIAL WORK - NSDCPEFALRISK_GEN_ALL_CORE
For information on Fall & Injury Prevention, visit: [email or web address] OR  [email or web address] OR  https://www.cdc.gov/steadi/patient.html

## 2024-03-05 NOTE — PROGRESS NOTE ADULT - SUBJECTIVE AND OBJECTIVE BOX
24H events:    Patient is a 28y old Female who presents with a chief complaint of pustular pre-septal cellulits (04 Mar 2024 19:20)    Primary diagnosis of Preseptal cellulitis    Today is hospital day 1d.   On erythromycin drops, pending bacterial and viral culture.     PAST MEDICAL & SURGICAL HISTORY  No pertinent past medical history      SOCIAL HISTORY:  Social History:      ALLERGIES:  No Known Allergies    MEDICATIONS:  STANDING MEDICATIONS  acyclovir IVPB 380 milliGRAM(s) IV Intermittent every 8 hours  acyclovir IVPB      ampicillin/sulbactam  IVPB      ampicillin/sulbactam  IVPB 3 Gram(s) IV Intermittent every 6 hours  erythromycin   Ointment 1 Application(s) Right EYE four times a day  linezolid  IVPB 600 milliGRAM(s) IV Intermittent every 12 hours    PRN MEDICATIONS    VITALS:   T(F): 98.2  HR: 77  BP: 112/79  RR: 18  SpO2: 100%    PHYSICAL EXAM:    GENERAL: NAD, non-toxic appearing  HEENT: L eye with conjunctival injection and surrounding vesicular papules, normal visual acuity, no visual field defects    NECK: Supple, no stiffness, no JVD, no thyromegaly   HEART: Regular rate and rhythm, normal s1s2  LUNGS: Unlabored respirations, b/l air entry, no adventitious breath sounds   ABDOMEN: Soft, nontender, nondistended; +BS  EXTREMITIES: No clubbing, cyanosis, or edema; 2+ peripheral pulses   NERVOUS SYSTEM: AOx3  SKIN: Warm and dry, no rashes or lesions      LABS:                        13.0   9.34  )-----------( 353      ( 04 Mar 2024 14:54 )             40.1     03-04    141  |  103  |  7<L>  ----------------------------<  99  4.8   |  26  |  0.6<L>    Ca    10.1      04 Mar 2024 14:54    TPro  8.2<H>  /  Alb  4.5  /  TBili  0.3  /  DBili  x   /  AST  19  /  ALT  <5  /  AlkPhos  56  03-04      Urinalysis Basic - ( 04 Mar 2024 14:54 )    Color: x / Appearance: x / SG: x / pH: x  Gluc: 99 mg/dL / Ketone: x  / Bili: x / Urobili: x   Blood: x / Protein: x / Nitrite: x   Leuk Esterase: x / RBC: x / WBC x   Sq Epi: x / Non Sq Epi: x / Bacteria: x          RADIOLOGY:

## 2024-03-06 LAB — HAV IGM SER-ACNC: SIGNIFICANT CHANGE UP

## 2024-03-07 LAB
-  AMPICILLIN/SULBACTAM: SIGNIFICANT CHANGE UP
-  CEFAZOLIN: SIGNIFICANT CHANGE UP
-  CLINDAMYCIN: SIGNIFICANT CHANGE UP
-  ERYTHROMYCIN: SIGNIFICANT CHANGE UP
-  GENTAMICIN: SIGNIFICANT CHANGE UP
-  OXACILLIN: SIGNIFICANT CHANGE UP
-  PENICILLIN: SIGNIFICANT CHANGE UP
-  RIFAMPIN: SIGNIFICANT CHANGE UP
-  TETRACYCLINE: SIGNIFICANT CHANGE UP
-  TRIMETHOPRIM/SULFAMETHOXAZOLE: SIGNIFICANT CHANGE UP
-  VANCOMYCIN: SIGNIFICANT CHANGE UP
CULTURE RESULTS: ABNORMAL
METHOD TYPE: SIGNIFICANT CHANGE UP
ORGANISM # SPEC MICROSCOPIC CNT: ABNORMAL
ORGANISM # SPEC MICROSCOPIC CNT: SIGNIFICANT CHANGE UP
SPECIMEN SOURCE: SIGNIFICANT CHANGE UP

## 2024-03-08 LAB
HERPES SIMPLEX VIRUS 1 AG: ABNORMAL
HERPES SIMPLEX VIRUS 2 AG: SIGNIFICANT CHANGE UP
HHV SPEC CULT: ABNORMAL
HSV1 AG SPEC QL: ABNORMAL
HSV2 AG SPEC QL: SIGNIFICANT CHANGE UP

## 2024-03-11 DIAGNOSIS — B96.89 OTHER SPECIFIED BACTERIAL AGENTS AS THE CAUSE OF DISEASES CLASSIFIED ELSEWHERE: ICD-10-CM

## 2024-03-11 DIAGNOSIS — L03.213 PERIORBITAL CELLULITIS: ICD-10-CM

## 2024-03-11 DIAGNOSIS — B00.59 OTHER HERPESVIRAL DISEASE OF EYE: ICD-10-CM
